# Patient Record
Sex: FEMALE | Race: BLACK OR AFRICAN AMERICAN | NOT HISPANIC OR LATINO | Employment: UNEMPLOYED | URBAN - METROPOLITAN AREA
[De-identification: names, ages, dates, MRNs, and addresses within clinical notes are randomized per-mention and may not be internally consistent; named-entity substitution may affect disease eponyms.]

---

## 2023-06-30 PROBLEM — J45.909 ASTHMA (HHS-HCC): Status: ACTIVE | Noted: 2023-06-30

## 2023-06-30 PROBLEM — H52.7 REFRACTION ERROR: Status: ACTIVE | Noted: 2023-06-30

## 2023-06-30 PROBLEM — R63.2 POLYPHAGIA: Status: ACTIVE | Noted: 2023-06-30

## 2023-06-30 PROBLEM — L81.8 TATTOO: Status: ACTIVE | Noted: 2023-06-30

## 2023-06-30 PROBLEM — E11.9 DIABETES MELLITUS (MULTI): Status: ACTIVE | Noted: 2023-06-30

## 2023-06-30 PROBLEM — K21.9 GERD (GASTROESOPHAGEAL REFLUX DISEASE): Status: ACTIVE | Noted: 2023-06-30

## 2023-06-30 PROBLEM — M25.511 SHOULDER PAIN, RIGHT: Status: ACTIVE | Noted: 2023-06-30

## 2023-06-30 PROBLEM — M25.562 LEFT KNEE PAIN: Status: ACTIVE | Noted: 2023-06-30

## 2023-06-30 PROBLEM — M79.643 HAND PAIN: Status: ACTIVE | Noted: 2023-06-30

## 2023-06-30 PROBLEM — R05.3 CHRONIC COUGH: Status: ACTIVE | Noted: 2023-06-30

## 2023-06-30 PROBLEM — J30.9 ALLERGIC RHINITIS: Status: ACTIVE | Noted: 2023-06-30

## 2023-12-08 ENCOUNTER — OFFICE VISIT (OUTPATIENT)
Dept: SURGERY | Facility: HOSPITAL | Age: 48
End: 2023-12-08
Payer: COMMERCIAL

## 2023-12-08 VITALS
HEIGHT: 67 IN | OXYGEN SATURATION: 98 % | RESPIRATION RATE: 19 BRPM | DIASTOLIC BLOOD PRESSURE: 89 MMHG | WEIGHT: 274.5 LBS | HEART RATE: 95 BPM | TEMPERATURE: 98.2 F | SYSTOLIC BLOOD PRESSURE: 157 MMHG | BODY MASS INDEX: 43.08 KG/M2

## 2023-12-08 DIAGNOSIS — E63.9 INADEQUATE CALORIC INTAKE: ICD-10-CM

## 2023-12-08 DIAGNOSIS — Z72.3 INADEQUATE EXERCISE: ICD-10-CM

## 2023-12-08 DIAGNOSIS — E66.01 CLASS 3 SEVERE OBESITY DUE TO EXCESS CALORIES WITH SERIOUS COMORBIDITY AND BODY MASS INDEX (BMI) OF 40.0 TO 44.9 IN ADULT (MULTI): Primary | ICD-10-CM

## 2023-12-08 DIAGNOSIS — R63.2 POLYPHAGIA: ICD-10-CM

## 2023-12-08 PROBLEM — E66.813 CLASS 3 SEVERE OBESITY DUE TO EXCESS CALORIES WITH SERIOUS COMORBIDITY AND BODY MASS INDEX (BMI) OF 40.0 TO 44.9 IN ADULT: Status: ACTIVE | Noted: 2023-12-08

## 2023-12-08 PROCEDURE — 99204 OFFICE O/P NEW MOD 45 MIN: CPT

## 2023-12-08 PROCEDURE — 3079F DIAST BP 80-89 MM HG: CPT

## 2023-12-08 PROCEDURE — 99214 OFFICE O/P EST MOD 30 MIN: CPT | Mod: ZK

## 2023-12-08 PROCEDURE — 3008F BODY MASS INDEX DOCD: CPT

## 2023-12-08 PROCEDURE — 3077F SYST BP >= 140 MM HG: CPT

## 2023-12-08 ASSESSMENT — PATIENT HEALTH QUESTIONNAIRE - PHQ9
SUM OF ALL RESPONSES TO PHQ9 QUESTIONS 1 AND 2: 4
2. FEELING DOWN, DEPRESSED OR HOPELESS: MORE THAN HALF THE DAYS
1. LITTLE INTEREST OR PLEASURE IN DOING THINGS: MORE THAN HALF THE DAYS

## 2023-12-08 ASSESSMENT — PAIN SCALES - GENERAL: PAINLEVEL: 0-NO PAIN

## 2023-12-08 ASSESSMENT — LIFESTYLE VARIABLES
HOW MANY STANDARD DRINKS CONTAINING ALCOHOL DO YOU HAVE ON A TYPICAL DAY: 1 OR 2
AUDIT-C TOTAL SCORE: 1
HOW OFTEN DO YOU HAVE SIX OR MORE DRINKS ON ONE OCCASION: NEVER
HOW OFTEN DO YOU HAVE A DRINK CONTAINING ALCOHOL: MONTHLY OR LESS
SKIP TO QUESTIONS 9-10: 1

## 2023-12-08 NOTE — PATIENT INSTRUCTIONS
*Please schedule with outpatient dietitian to optimize the dietary recommendations below to your individual food preferences and dietary patterns by calling [phone]*    *If lab work ordered for you today - complete prior to next visit - labs are fasting*    A few key concepts for weight management:  1. Calorie control is necessary for weight loss  2. Protein prioritization helps control appetite and helps to maintain lean/muscle body mass with weight loss  3. Fiber rich foods ensure balanced blood sugar and help you stay full, also high nutrient foods  4. Consistency is critical for long term success  5. Successful weight loss requires trade offs - we acknowledge that meal planning and some element of preparation for most days of the week is necessary for success  6. Set an environment for success - keep binge-worthy processed food products out of the home as much as possible  7. Tracking some component of your nutrition intake (whether it's calories, macros, carbs, meal structure, etc) is a tool to help you stay consistent and accountable  8. Regular physical activity is critical for weight loss maintenance. Exercise helps us maintain lean body mass (muscle mass) which helps to maintain a higher metabolic rate (how many calories your burn daily). Without maintaining lean mass/muscle mass, weight loss is more difficult to sustain long term.     Detailed Diet Recommendations:  Self monitoring through some metric can be a tool to help with weight loss. Measuring portions can be one way to monitor your nutrition intake to support health improvement.   - Some apps you can use are RadioScapepal, Lose It, Carb Manager, Cronometer, etc. Self monitoring is an extremely useful tool however please recognize that external tracking devices for activity and calories are not perfect, underestimating of total calorie intake is common, as well as overestimation through activity trackers of total calorie expenditure.  "    Please limit processed foods in the diet as they are known to contribute to obesity and weight gain. Processed foods include food items like bakery, chips, snack crackers, cereals, cookies, some pasta/breads, etc. These foods will typically come from \"bag, boxes with barcodes\" from the middle aisles of the grocery store - they contain added sugars and refined flours, are low in fiber and may stimulate appetite rather than help manage it effectively.     Increasing your intake of HIGH FIBER carbohydrates sources to increase fullness and appetite regulation with meals and between meals. High fiber foods are vegetables, fruit, beans, lentils, nuts/seeds, etc.      WHAT WILL MY PLATE LOOK LIKE IF I EAT LIKE THIS?.    - Aim for MINIMUM 25-30g of protein at meals: approx 4 oz of high protein food like chicken, fish, turkey, beef, pork, 2-3 eggs, 1/2-3/4 cup cottage cheese or plain greek yogurt. At each meal EAT PROTEIN FIRST! This sets the stage to help better regulate your appetite during and between meals. Think \"Palm of your hand portion of protein\" at each meal.     - The remainder of the plate is optimized to increase quality of the diet by including plenty of vegetables, appropriate fruit or starch portions: Aim for \"2 fist sized portions of fruits and vegetables with each meal\". A \"fist sized\" portion is about 1 cup.   - Choose a variety of vegetables, fruits and whole grains - aim to eat a wide variety of colors to improve quality of diet.     Fat included with meals is best in small portions - Fat is calorie dense so it is important to monitor the portion, to avoid excess calories. Keep servings to the followin-2 tbsp olive/avocado/coconut oil (can be used to cook or dress vegetables), 1-2 tbsp unsweetened nut butter (peanut, almond, etc), 5-6 olives, 1/3 avocado, 1/2-1 oz nuts/seeds likes walnuts, almonds, pecans, brazil nuts, etc. This would include if you are cooking your meal with oil/butter/etc. "     Please avoid liquid sources of calories as we often do not think about their contribution to our total daily calorie intake nor do they help regulate appetite when working towards weight loss. Hydration is important, aim for at least 64 oz zero calorie beverages daily, ideally water.     Exercise Recommendations:   Continue regular exercise regimen - you are following an appropriate program of 4x/week of resistance training & aerobic training for 60-90 minutes per session, achieving > than 200 minute per week goal.    Web based resources for meal planning:  www.[employer] - This is a website authored by registered dietitians, has many great resources for healthy nutrition.     Follow up: 6 weeks    If appointment was not scheduled before leaving today please call [phone] to speak with an .    Follow up visits are typically VIRTUAL  - Please have a reliable scale to weigh yourself at home for follow up visits  - Please have a blood pressure cuff to monitor blood pressure & heart rate at home (can be purchased over the counter, on Amazon, drug store, etc).   - For virtual visits you must be in the State of [place]  - YOU CANNOT BE DRIVING, please remember this is an appointment and should be treated the same as if you were in the office for follow up appointment.

## 2023-12-08 NOTE — PROGRESS NOTES
Subjective     Patient ID: Sunshine Self is a 48 y.o. female who presents for Weight loss management (New patient visit).    HPI  Initial Weight: 274 lbs  Initial BMI: 43  Highest Weight: 274 lbs  Lowest weight: 210 lbs, 160 lbs prior to having kids    Otc or Prescription medications - Phentermine  High-Sugar beverages: couple times a week  FF/going out/take out: daily  Exercise: got membership to pool, did not start yet     Used to see Martha Wilson Last visit couple years ago (11/30/2021)    Past Medical History:   Diagnosis Date    Achilles tendinitis, unspecified leg     Achilles tendon pain    Contact with and (suspected) exposure to infections with a predominantly sexual mode of transmission 04/08/2015    Exposure to sexually transmitted disease (STD)    Inappropriate diet and eating habits 08/30/2018    Inappropriate diet and eating habits    Infection and inflammatory reaction due to cystostomy catheter, sequela 12/13/2016    Urinary tract infection associated with cystostomy catheter, sequela    Maternal care for other specified fetal problems, unspecified trimester, fetus 5 02/24/2015    Antepartum fetal acidosis, unspecified trimester, fetus 5    Other specified noninflammatory disorders of vagina 05/15/2015    Vaginal irritation    Personal history of other diseases of the digestive system 05/16/2016    History of hemorrhoids    Personal history of other diseases of the female genital tract 12/13/2016    History of vaginal discharge    Personal history of other diseases of the female genital tract 12/24/2014    History of dysmenorrhea    Personal history of other infectious and parasitic diseases 12/24/2014    History of herpes genitalis    Personal history of other specified conditions 06/10/2014    History of abdominal pain    Personal history of other specified conditions 11/09/2015    History of itching    Personal history of urinary (tract) infections 12/13/2016    History of urinary tract  "infection    Strain of right Achilles tendon, sequela 12/31/2015    Rupture of Achilles tendon, right, sequela      Past Surgical History:   Procedure Laterality Date    TUBAL LIGATION  03/02/2015    Tubal Ligation      Family History   Problem Relation Name Age of Onset    Lung cancer Mother      Breast cancer Mother's Sister        Social History     Tobacco Use   Smoking Status Every Day    Types: Cigarettes    Passive exposure: Past   Smokeless Tobacco Never      Social History     Substance and Sexual Activity   Drug Use Not Currently    Types: MDMA (ecstacy), Cocaine      Social History     Substance and Sexual Activity   Alcohol Use Yes        Allergies  Allergies   Allergen Reactions    Darvocet A500 [Propoxyphene N-Acetaminophen] Hives    Ibuprofen GI Upset    Naproxen GI Upset    Tramadol GI Upset          VITALS  Visit Vitals  /89   Pulse 95   Temp 36.8 °C (98.2 °F)   Resp 19   Ht 1.689 m (5' 6.5\")   Wt 125 kg (274 lb 8 oz)   SpO2 98%   BMI 43.64 kg/m²   Smoking Status Every Day   BSA 2.42 m²        LABS  No results found for: \"NFCURPRZ01\", \"BPPM3PX\", \"VITAMINB6\", \"TSH\", \"FOLATE\", \"PTH\", \"VITD25\", \"TIBC\", \"IRON\", \"FERRITIN\", \"PR1\", \"CMPLAS\", \"ZINC\", \"VTAI\", \"VITAMINK\"  No results found for: \"WBC\", \"HGB\", \"HCT\", \"MCV\", \"PLT\"         No lab exists for component: \"LABALBU\" No results found for: \"GLUCOSE\", \"CALCIUM\", \"NA\", \"K\", \"CO2\", \"CL\", \"BUN\", \"CREATININE\"    ROS  Review of Systems  GENERAL: Denies fever/chills       HEENT: Denies headache, new or worsening vision and hearing problems, nasal congestion, hoarseness, sore throat             CV: Denies chest pain, palpitations         RESP: Denies SOB, cough, wheezing              GI: Denies n/v, abdominal pain, diarrhea, constipation            : Denies urinary urgency/frequency     NEURO: Denies headache, weakness, numbness, tingling       ENDO: Denies excessive heat/cold, recent weight gain/loss        MUSC:Denies low back pain, joint pain      " PSYCH: Denies substance use disorder, anxiety, depression       PHYSICAL EXAM  Physical Exam  General- No acute distress, well appearing and well nourished. Obese  HEENT - no erythema, swelling or discharge. Neck supple, no cervical lymphadenopathy.   Pulmonary - respiratory effort normal, lungs CTAB.   Cardiovascular - HRR, no m/r/g  Extremities - no edema and/or varicosities, no peripheral edema  Abdomen - Soft, Non-tender, non-distended, no abdominal masses.   Musculoskeletal - Range of motion WNL  Skin - normal without rashes or lesions.  Neurologic - reflexes intact, coordination WNL, gait WNL  Psychiatric - AXO x3, mood and affect appropriate        ASSESSMENT/PLAN  Patient here for medical weight loss.    Assessment/Plan   Problem List Items Addressed This Visit       Polyphagia    Relevant Orders    Referral to Nutrition Services    Inadequate caloric intake    Relevant Orders    Referral to Nutrition Services    Inadequate exercise    Class 3 severe obesity due to excess calories with serious comorbidity and body mass index (BMI) of 40.0 to 44.9 in adult (CMS/[facility]) - Primary    Relevant Orders    Referral to Nutrition Services        Initial Weight: 274 lbs  Initial BMI: 43    Not interested in bariatric surgery     Blood work 12/01/23 (ordered by PCP)  CBC: , the rest of it WNL  CMP WNL  Hgb A1C 5.8 - Prediabetes     Reviewed past and active medical history, patient has no current contraindications to use of medication for adjunct treatment in weight management.  Discussed that patient has to start with the lifestyle modifications, such as adjust her diet and start with daily exercise. She has to adhere to the new lifestyle for 4-6 weeks, keep her food and exercise logs. These modifications will help the patient loose some weight, which will be considered a good chapo effort. After that we can start adding weight loss medications. Reviewed that medications for weight loss may be short or long term,  but that if weight loss is not realized within 12 weeks of initiating, medication will be discontinued to review alternative treatment options.    1500 hero meal plans, High Protein Snacks, [employer] Shopping List, Protein Count Guide and other handouts given.  Nutrition consult is placed.  Emphasized that medications are used as adjunct to diet and exercise for weight management    Counseled on dietary patterns to support weight loss and need for regular aerobic and strength based exercises to enhance weight loss and maintenance.      > 50% of time spent counseling on the importance of following recommended dietary modifications including: role of diet, low calorie and carbohydrate restrictions, limiting fast food and avoiding high sugar beverages.   Also discussed, the role of exercise with an ultimate goal of at least 250-300 minutes a week for weight loss and weight maintenance.    Follow-up: 6 weeks    RALPH Smith-CNP

## 2023-12-21 ENCOUNTER — APPOINTMENT (OUTPATIENT)
Dept: RADIOLOGY | Facility: CLINIC | Age: 48
End: 2023-12-21
Payer: COMMERCIAL

## 2023-12-21 ENCOUNTER — APPOINTMENT (OUTPATIENT)
Dept: ORTHOPEDIC SURGERY | Facility: CLINIC | Age: 48
End: 2023-12-21
Payer: COMMERCIAL

## 2024-01-02 ENCOUNTER — APPOINTMENT (OUTPATIENT)
Dept: ORTHOPEDIC SURGERY | Facility: CLINIC | Age: 49
End: 2024-01-02
Payer: COMMERCIAL

## 2024-01-05 ENCOUNTER — ANCILLARY PROCEDURE (OUTPATIENT)
Dept: RADIOLOGY | Facility: CLINIC | Age: 49
End: 2024-01-05
Payer: COMMERCIAL

## 2024-01-05 DIAGNOSIS — M25.561 RIGHT KNEE PAIN, UNSPECIFIED CHRONICITY: ICD-10-CM

## 2024-01-05 PROCEDURE — 73564 X-RAY EXAM KNEE 4 OR MORE: CPT | Mod: RT

## 2024-01-05 PROCEDURE — 73564 X-RAY EXAM KNEE 4 OR MORE: CPT | Mod: RIGHT SIDE | Performed by: RADIOLOGY

## 2024-01-15 ENCOUNTER — OFFICE VISIT (OUTPATIENT)
Dept: ORTHOPEDIC SURGERY | Facility: HOSPITAL | Age: 49
End: 2024-01-15
Payer: COMMERCIAL

## 2024-01-15 DIAGNOSIS — M25.561 RIGHT KNEE PAIN, UNSPECIFIED CHRONICITY: ICD-10-CM

## 2024-01-15 PROCEDURE — 99213 OFFICE O/P EST LOW 20 MIN: CPT | Performed by: ORTHOPAEDIC SURGERY

## 2024-01-15 PROCEDURE — 3008F BODY MASS INDEX DOCD: CPT | Performed by: ORTHOPAEDIC SURGERY

## 2024-01-15 RX ORDER — MELOXICAM 15 MG/1
15 TABLET ORAL DAILY
Qty: 10 TABLET | Refills: 0 | Status: SHIPPED | OUTPATIENT
Start: 2024-01-15 | End: 2024-01-25

## 2024-01-15 NOTE — PROGRESS NOTES
Patient here for evaluation of pain in the lateral aspect of her right knee that started 1 month ago when he twisted the knee while roughhousing.  She has pain and swelling and it is since improved somewhat she had taken some oral steroids which made her sick.  She also had a muscle relaxant and some pain medication which she took sparingly.  She is not on any medicines right now.  She did have x-rays which are available for review.    The patient is pleasant and cooperative.  The patient is alert and oriented ×3.  Auditory function is intact.  The patient is a good historian.  The patient is not in acute distress.  Eye exam significant for nonicteric sclera, intact ocular muscle movement.  Breathing is rhythmic symmetric and nonlabored.  Patient has body mass index of 43.  She has intact integument of right lower extremity small bruise on the anterior shin.  Calf is soft and supple nontender tibialis posterior pulse palpable Homans test is negative.  Range of motion of the right knee 0 to 130 degrees slight pain on hyperflexion slight tenderness of the lateral joint line no ligament pathologic laxity.  no effusion.    X-rays were obtained and reviewed today the tibiofemoral and retropatellar joint spaces are well-preserved no obvious osteophyte formation fracture dislocation or subluxation.    Right knee sprain    We discussed the potential diagnosis and treatment options and alternatives I recommended a course of anti-inflammatory medicine and observation over the next 2 weeks and repeat examination in 2 weeks.    This was dictated using voice recognition software and not corrected for grammatical or spelling errors.

## 2024-01-29 ENCOUNTER — OFFICE VISIT (OUTPATIENT)
Dept: ORTHOPEDIC SURGERY | Facility: HOSPITAL | Age: 49
End: 2024-01-29
Payer: COMMERCIAL

## 2024-01-29 DIAGNOSIS — S86.911A KNEE STRAIN, RIGHT, INITIAL ENCOUNTER: Primary | ICD-10-CM

## 2024-01-29 PROCEDURE — 99213 OFFICE O/P EST LOW 20 MIN: CPT | Performed by: ORTHOPAEDIC SURGERY

## 2024-01-29 PROCEDURE — 3008F BODY MASS INDEX DOCD: CPT | Performed by: ORTHOPAEDIC SURGERY

## 2024-01-29 RX ORDER — MELOXICAM 15 MG/1
15 TABLET ORAL DAILY
Qty: 30 TABLET | Refills: 11 | Status: SHIPPED | OUTPATIENT
Start: 2024-01-29 | End: 2025-01-28

## 2024-01-29 NOTE — PROGRESS NOTES
Patient is here for reevaluation of her right knee she has been concerned about a blood clot.  Her right knee is actually improved on meloxicam.  She still has some lateral anterior lateral pain.  Right knee manage is a very small effusion.  Range of motion 0 to 140 degrees.  Calf soft nontender Homans test negative tibialis posterior pulse palpable.    Right knee pain.    Patient has improved I recommended to 1 more week on meloxicam recommend formal physical therapy consult.  Prognosis is excellent.  Follow-up will be as needed.    This was dictated using voice recognition software and not corrected for grammatical or spelling errors.

## 2024-05-02 ENCOUNTER — APPOINTMENT (OUTPATIENT)
Dept: SURGERY | Facility: CLINIC | Age: 49
End: 2024-05-02
Payer: COMMERCIAL

## 2024-05-03 ENCOUNTER — APPOINTMENT (OUTPATIENT)
Dept: ORTHOPEDIC SURGERY | Facility: HOSPITAL | Age: 49
End: 2024-05-03
Payer: COMMERCIAL

## 2024-05-31 ENCOUNTER — OFFICE VISIT (OUTPATIENT)
Dept: ORTHOPEDIC SURGERY | Facility: HOSPITAL | Age: 49
End: 2024-05-31
Payer: COMMERCIAL

## 2024-05-31 VITALS — WEIGHT: 267 LBS | HEIGHT: 67 IN | BODY MASS INDEX: 41.91 KG/M2

## 2024-05-31 DIAGNOSIS — M79.604 PAIN AND SWELLING OF RIGHT LOWER EXTREMITY: ICD-10-CM

## 2024-05-31 DIAGNOSIS — M79.89 PAIN AND SWELLING OF RIGHT LOWER EXTREMITY: ICD-10-CM

## 2024-05-31 DIAGNOSIS — S83.241D ACUTE MEDIAL MENISCUS TEAR OF RIGHT KNEE, SUBSEQUENT ENCOUNTER: ICD-10-CM

## 2024-05-31 PROCEDURE — 99213 OFFICE O/P EST LOW 20 MIN: CPT | Performed by: ORTHOPAEDIC SURGERY

## 2024-05-31 PROCEDURE — 3008F BODY MASS INDEX DOCD: CPT | Performed by: ORTHOPAEDIC SURGERY

## 2024-05-31 RX ORDER — DICLOFENAC SODIUM 75 MG/1
75 TABLET, DELAYED RELEASE ORAL 2 TIMES DAILY
Qty: 28 TABLET | Refills: 0 | Status: SHIPPED | OUTPATIENT
Start: 2024-05-31 | End: 2024-06-14

## 2024-05-31 NOTE — PROGRESS NOTES
Here for reevaluation of her right knee she did have another opinion at [facility] and told that she might need surgery.  She has had recurrence of swelling and pain and tightness in her knee and leg.  She has mechanical capture as well.    Right knee definite effusion mild diffuse tenderness nonfocal.  Range of motion 0 to 100 degrees tightness in flexion with attempted beyond 100 degrees.  No instability to varus or valgus stress.  Calf soft and nontender tibialis posterior pulses palpable Homans test is negative.    Right knee injury.    Patient sustained a right knee injury which is continue to give her symptoms despite nonsurgical treatment, including anti-inflammatory medications specifically meloxicam and multiple evaluations.  I have recommended further evaluation by MRI scan.  Patient will follow-up after MRI scan new prescription for Voltaren provided today.    Given the tightness in the leg the patient has inquired about possible blood clot I think would be reasonable to obtain a duplex scan today.  Will have that done as well.    This was dictated using voice recognition software and not corrected for grammatical or spelling errors.

## 2024-06-15 ENCOUNTER — APPOINTMENT (OUTPATIENT)
Dept: PRIMARY CARE | Facility: CLINIC | Age: 49
End: 2024-06-15
Payer: COMMERCIAL

## 2024-06-18 ENCOUNTER — APPOINTMENT (OUTPATIENT)
Dept: RADIOLOGY | Facility: HOSPITAL | Age: 49
End: 2024-06-18
Payer: COMMERCIAL

## 2024-06-18 ENCOUNTER — APPOINTMENT (OUTPATIENT)
Dept: VASCULAR MEDICINE | Facility: HOSPITAL | Age: 49
End: 2024-06-18
Payer: COMMERCIAL

## 2024-07-02 ENCOUNTER — HOSPITAL ENCOUNTER (OUTPATIENT)
Dept: RADIOLOGY | Facility: CLINIC | Age: 49
Discharge: HOME | End: 2024-07-02
Payer: COMMERCIAL

## 2024-07-02 DIAGNOSIS — S83.241D ACUTE MEDIAL MENISCUS TEAR OF RIGHT KNEE, SUBSEQUENT ENCOUNTER: ICD-10-CM

## 2024-07-11 ENCOUNTER — HOSPITAL ENCOUNTER (OUTPATIENT)
Dept: RADIOLOGY | Facility: CLINIC | Age: 49
End: 2024-07-11
Payer: COMMERCIAL

## 2024-07-12 ENCOUNTER — APPOINTMENT (OUTPATIENT)
Dept: RADIOLOGY | Facility: CLINIC | Age: 49
End: 2024-07-12
Payer: COMMERCIAL

## 2024-07-12 ENCOUNTER — HOSPITAL ENCOUNTER (OUTPATIENT)
Dept: RADIOLOGY | Facility: CLINIC | Age: 49
End: 2024-07-12
Payer: COMMERCIAL

## 2024-07-18 ENCOUNTER — APPOINTMENT (OUTPATIENT)
Dept: RADIOLOGY | Facility: CLINIC | Age: 49
End: 2024-07-18
Payer: COMMERCIAL

## 2024-07-23 ENCOUNTER — APPOINTMENT (OUTPATIENT)
Dept: RADIOLOGY | Facility: CLINIC | Age: 49
End: 2024-07-23
Payer: COMMERCIAL

## 2024-08-05 ENCOUNTER — APPOINTMENT (OUTPATIENT)
Dept: RADIOLOGY | Facility: CLINIC | Age: 49
End: 2024-08-05
Payer: COMMERCIAL

## 2024-08-08 ENCOUNTER — OFFICE VISIT (OUTPATIENT)
Dept: GASTROENTEROLOGY | Facility: CLINIC | Age: 49
End: 2024-08-08
Payer: COMMERCIAL

## 2024-08-08 ENCOUNTER — OFFICE VISIT (OUTPATIENT)
Dept: DERMATOLOGY | Facility: CLINIC | Age: 49
End: 2024-08-08
Payer: COMMERCIAL

## 2024-08-08 VITALS
HEIGHT: 67 IN | HEART RATE: 81 BPM | DIASTOLIC BLOOD PRESSURE: 84 MMHG | WEIGHT: 263.6 LBS | BODY MASS INDEX: 41.37 KG/M2 | SYSTOLIC BLOOD PRESSURE: 138 MMHG | TEMPERATURE: 97.5 F

## 2024-08-08 DIAGNOSIS — L30.9 DERMATITIS: Primary | ICD-10-CM

## 2024-08-08 DIAGNOSIS — Z12.11 COLON CANCER SCREENING: ICD-10-CM

## 2024-08-08 DIAGNOSIS — R10.13 EPIGASTRIC PAIN: Primary | ICD-10-CM

## 2024-08-08 PROCEDURE — 99203 OFFICE O/P NEW LOW 30 MIN: CPT | Performed by: STUDENT IN AN ORGANIZED HEALTH CARE EDUCATION/TRAINING PROGRAM

## 2024-08-08 PROCEDURE — 99214 OFFICE O/P EST MOD 30 MIN: CPT | Performed by: NURSE PRACTITIONER

## 2024-08-08 PROCEDURE — 3008F BODY MASS INDEX DOCD: CPT | Performed by: NURSE PRACTITIONER

## 2024-08-08 PROCEDURE — 3079F DIAST BP 80-89 MM HG: CPT | Performed by: NURSE PRACTITIONER

## 2024-08-08 PROCEDURE — 3075F SYST BP GE 130 - 139MM HG: CPT | Performed by: NURSE PRACTITIONER

## 2024-08-08 RX ORDER — CIPROFLOXACIN 500 MG/1
TABLET ORAL
COMMUNITY
Start: 2024-04-01 | End: 2024-08-08 | Stop reason: WASHOUT

## 2024-08-08 RX ORDER — IBUPROFEN 600 MG/1
TABLET ORAL
COMMUNITY
Start: 2024-07-31 | End: 2024-08-08 | Stop reason: WASHOUT

## 2024-08-08 RX ORDER — CEPHALEXIN 500 MG/1
1 CAPSULE ORAL
COMMUNITY
Start: 2024-07-30 | End: 2024-08-08 | Stop reason: WASHOUT

## 2024-08-08 RX ORDER — CICLOPIROX 80 MG/ML
SOLUTION TOPICAL
COMMUNITY
Start: 2024-04-24

## 2024-08-08 RX ORDER — DEXTROAMPHETAMINE SACCHARATE, AMPHETAMINE ASPARTATE, DEXTROAMPHETAMINE SULFATE AND AMPHETAMINE SULFATE 7.5; 7.5; 7.5; 7.5 MG/1; MG/1; MG/1; MG/1
30 TABLET ORAL 2 TIMES DAILY
COMMUNITY
Start: 2024-08-02 | End: 2024-09-01

## 2024-08-08 RX ORDER — PERMETHRIN 50 MG/G
CREAM TOPICAL
COMMUNITY
Start: 2023-11-24

## 2024-08-08 RX ORDER — CLOBETASOL PROPIONATE 0.5 MG/G
OINTMENT TOPICAL
Qty: 60 G | Refills: 0 | Status: SHIPPED | OUTPATIENT
Start: 2024-08-08

## 2024-08-08 RX ORDER — PROMETHAZINE HYDROCHLORIDE AND CODEINE PHOSPHATE 6.25; 1 MG/5ML; MG/5ML
SOLUTION ORAL
COMMUNITY
Start: 2024-08-02

## 2024-08-08 RX ORDER — CLINDAMYCIN HYDROCHLORIDE 300 MG/1
300 CAPSULE ORAL 3 TIMES DAILY
COMMUNITY
Start: 2024-07-25

## 2024-08-08 RX ORDER — CAPSAICIN 0.03 G/100G
CREAM TOPICAL 4 TIMES DAILY
COMMUNITY
Start: 2024-04-24 | End: 2024-08-08 | Stop reason: WASHOUT

## 2024-08-08 RX ORDER — CLOTRIMAZOLE 1 %
CREAM (GRAM) TOPICAL
COMMUNITY

## 2024-08-08 RX ORDER — ACETAMINOPHEN 500 MG
TABLET ORAL
COMMUNITY
Start: 2024-04-19

## 2024-08-08 RX ORDER — PHENTERMINE HYDROCHLORIDE 37.5 MG/1
1 TABLET ORAL
COMMUNITY
Start: 2024-07-02

## 2024-08-08 RX ORDER — OFLOXACIN 3 MG/ML
SOLUTION AURICULAR (OTIC)
COMMUNITY
Start: 2024-05-23

## 2024-08-08 RX ORDER — POLYETHYLENE GLYCOL 400 AND PROPYLENE GLYCOL 4; 3 MG/ML; MG/ML
1 SOLUTION/ DROPS OPHTHALMIC
COMMUNITY
Start: 2024-04-05

## 2024-08-08 ASSESSMENT — DERMATOLOGY QUALITY OF LIFE (QOL) ASSESSMENT
RATE HOW BOTHERED YOU ARE BY SYMPTOMS OF YOUR SKIN PROBLEM (EG, ITCHING, STINGING BURNING, HURTING OR SKIN IRRITATION): 6 - ALWAYS BOTHERED
ARE THERE EXCLUSIONS OR EXCEPTIONS FOR THE QUALITY OF LIFE ASSESSMENT: NO
RATE HOW BOTHERED YOU ARE BY EFFECTS OF YOUR SKIN PROBLEMS ON YOUR ACTIVITIES (EG, GOING OUT, ACCOMPLISHING WHAT YOU WANT, WORK ACTIVITIES OR YOUR RELATIONSHIPS WITH OTHERS): 0 - NEVER BOTHERED
WHAT SINGLE SKIN CONDITION LISTED BELOW IS THE PATIENT ANSWERING THE QUALITY-OF-LIFE ASSESSMENT QUESTIONS ABOUT: NONE OF THE ABOVE
RATE HOW BOTHERED YOU ARE BY SYMPTOMS OF YOUR SKIN PROBLEM (EG, ITCHING, STINGING BURNING, HURTING OR SKIN IRRITATION): 6 - ALWAYS BOTHERED
RATE HOW EMOTIONALLY BOTHERED YOU ARE BY YOUR SKIN PROBLEM (FOR EXAMPLE, WORRY, EMBARRASSMENT, FRUSTRATION): 6 - ALWAYS BOTHERED
RATE HOW EMOTIONALLY BOTHERED YOU ARE BY YOUR SKIN PROBLEM (FOR EXAMPLE, WORRY, EMBARRASSMENT, FRUSTRATION): 6 - ALWAYS BOTHERED
DATE THE QUALITY-OF-LIFE ASSESSMENT WAS COMPLETED: 67060
WHAT SINGLE SKIN CONDITION LISTED BELOW IS THE PATIENT ANSWERING THE QUALITY-OF-LIFE ASSESSMENT QUESTIONS ABOUT: NONE OF THE ABOVE
RATE HOW BOTHERED YOU ARE BY EFFECTS OF YOUR SKIN PROBLEMS ON YOUR ACTIVITIES (EG, GOING OUT, ACCOMPLISHING WHAT YOU WANT, WORK ACTIVITIES OR YOUR RELATIONSHIPS WITH OTHERS): 0 - NEVER BOTHERED

## 2024-08-08 ASSESSMENT — PAIN SCALES - GENERAL: PAINLEVEL: 3

## 2024-08-08 ASSESSMENT — ENCOUNTER SYMPTOMS
RESPIRATORY NEGATIVE: 1
MUSCULOSKELETAL NEGATIVE: 1
CONSTITUTIONAL NEGATIVE: 1
ENDOCRINE NEGATIVE: 1
ALLERGIC/IMMUNOLOGIC NEGATIVE: 1
ABDOMINAL PAIN: 1
CARDIOVASCULAR NEGATIVE: 1
PSYCHIATRIC NEGATIVE: 1
NEUROLOGICAL NEGATIVE: 1
HEMATOLOGIC/LYMPHATIC NEGATIVE: 1
EYES NEGATIVE: 1

## 2024-08-08 ASSESSMENT — ITCH NUMERIC RATING SCALE: HOW SEVERE IS YOUR ITCHING?: 2

## 2024-08-08 NOTE — PATIENT INSTRUCTIONS
Epigastric pain- I would recommend using the esomeprazole daily to help with your symptoms. I will order a stool study for h-pylori as well as an EGD to evaluate the upper GI tract.     Screening for colonoscopy - I will order a cologuard test for you.    I will see you back for follow up after your testing

## 2024-08-08 NOTE — PROGRESS NOTES
Subjective     Sunshine Self is a 49 y.o. female who presents for the following: Rash (Swollen ear. Its burning and tender and top part of ear. ).     Review of Systems:  No other skin or systemic complaints other than what is documented elsewhere in the note.    The following portions of the chart were reviewed this encounter and updated as appropriate:          Skin Cancer History  No skin cancer on file.      Specialty Problems          Dermatology Problems    Tattoo        Objective   Well appearing patient in no apparent distress; mood and affect are within normal limits.    A focused skin examination was performed. All findings within normal limits unless otherwise noted below.    Assessment/Plan   1. Dermatitis  Left Ear, Left Preauricular Area  Well defined Reddish focally crusted fissured lichenified plaque    I favor eczematous given lichenifications, fissuring, crusting  There are no areas of hyperkeratosis to suspect discoid lupus    Related Medications  clobetasol (Temovate) 0.05 % ointment  Use a thin layer daily on ear for 2 weeks    Has used neosporin- STOP using neosporin    Start clobetsaol ointment for several weeks and will see if resolves  If continues occurring, consider patch testing or consideration of causative reaction from shampoo/coloring agent    Follow up as needed

## 2024-08-08 NOTE — PROGRESS NOTES
Subjective   Patient ID: Sunshine Self is a 49 y.o. female who presents for No chief complaint on file..  HPI  49-year-old female for new patient visit for evaluation of abdominal pain  Medical history includes asthma, COPD, ADHD, anxiety, bipolar disorder, obesity  Labs reviewed 5/9/2024  H&H 13.1 and 38.8  Epigastric pain  Felt like heart burn over the weekend  Was seen in the ER 3 times in the last few days  Was taking esomeprazole  Had black tarry after taking pepto bismol- now normal colored  Using aleve and motrin occasionally  No dysphagia  Did  FIT testing  FHX; no colon cancer  Social hx- smokes tobacco 1/2 ppd x 35 years, alcohol- occasionally, marijuana occasionally  Mom- lung cancer  Eating doesn't make a difference- just has pain no matter what  Goes up to her chest   Funny feeling in her chest  No nausea but mouth got watery  No diarrhea   No fever or chills      Review of Systems   Constitutional: Negative.    HENT: Negative.     Eyes: Negative.    Respiratory: Negative.     Cardiovascular: Negative.    Gastrointestinal:  Positive for abdominal pain.   Endocrine: Negative.    Genitourinary: Negative.    Musculoskeletal: Negative.    Skin: Negative.    Allergic/Immunologic: Negative.    Neurological: Negative.    Hematological: Negative.    Psychiatric/Behavioral: Negative.         Objective   Physical Exam  Constitutional:       Appearance: Normal appearance. She is obese.   HENT:      Head: Normocephalic.      Nose: Nose normal.      Mouth/Throat:      Mouth: Mucous membranes are moist.   Eyes:      Pupils: Pupils are equal, round, and reactive to light.   Cardiovascular:      Rate and Rhythm: Normal rate and regular rhythm.      Pulses: Normal pulses.      Heart sounds: Normal heart sounds.   Pulmonary:      Effort: Pulmonary effort is normal.      Breath sounds: Normal breath sounds.   Abdominal:      General: Bowel sounds are normal.      Palpations: Abdomen is soft.   Musculoskeletal:          General: Normal range of motion.      Cervical back: Normal range of motion and neck supple.   Skin:     General: Skin is warm and dry.   Neurological:      Mental Status: She is alert.   Psychiatric:         Mood and Affect: Mood normal.         Assessment/Plan        Epigastric pain- I would recommend using the esomeprazole daily to help with your symptoms. I will order a stool study for h-pylori as well as an EGD to evaluate the upper GI tract.     Screening for colonoscopy - I will order a cologuard test for you.    I will see you back for follow up after your testing    OLIVE Linder 08/08/24 8:14 AM

## 2024-08-14 ENCOUNTER — APPOINTMENT (OUTPATIENT)
Dept: OTOLARYNGOLOGY | Facility: CLINIC | Age: 49
End: 2024-08-14
Payer: COMMERCIAL

## 2024-08-15 ENCOUNTER — APPOINTMENT (OUTPATIENT)
Dept: PRIMARY CARE | Facility: CLINIC | Age: 49
End: 2024-08-15
Payer: COMMERCIAL

## 2024-08-15 DIAGNOSIS — L30.9 DERMATITIS: ICD-10-CM

## 2024-08-15 RX ORDER — CLOBETASOL PROPIONATE 0.5 MG/G
OINTMENT TOPICAL
Qty: 60 G | Refills: 0 | Status: SHIPPED | OUTPATIENT
Start: 2024-08-15

## 2024-08-16 ENCOUNTER — LAB (OUTPATIENT)
Dept: LAB | Facility: LAB | Age: 49
End: 2024-08-16
Payer: COMMERCIAL

## 2024-08-16 DIAGNOSIS — R10.13 EPIGASTRIC PAIN: ICD-10-CM

## 2024-08-16 PROCEDURE — 87449 NOS EACH ORGANISM AG IA: CPT

## 2024-08-19 ENCOUNTER — APPOINTMENT (OUTPATIENT)
Dept: CARDIOLOGY | Facility: CLINIC | Age: 49
End: 2024-08-19
Payer: COMMERCIAL

## 2024-08-19 ENCOUNTER — TELEPHONE (OUTPATIENT)
Dept: DERMATOLOGY | Facility: CLINIC | Age: 49
End: 2024-08-19
Payer: COMMERCIAL

## 2024-08-19 ENCOUNTER — OFFICE VISIT (OUTPATIENT)
Dept: ORTHOPEDIC SURGERY | Facility: HOSPITAL | Age: 49
End: 2024-08-19
Payer: COMMERCIAL

## 2024-08-19 DIAGNOSIS — M23.303 DERANGEMENT OF MEDIAL MENISCUS, RIGHT: ICD-10-CM

## 2024-08-19 PROCEDURE — 2500000005 HC RX 250 GENERAL PHARMACY W/O HCPCS: Performed by: ORTHOPAEDIC SURGERY

## 2024-08-19 PROCEDURE — 2500000004 HC RX 250 GENERAL PHARMACY W/ HCPCS (ALT 636 FOR OP/ED): Performed by: ORTHOPAEDIC SURGERY

## 2024-08-19 PROCEDURE — 20610 DRAIN/INJ JOINT/BURSA W/O US: CPT | Mod: RT | Performed by: ORTHOPAEDIC SURGERY

## 2024-08-19 PROCEDURE — 99213 OFFICE O/P EST LOW 20 MIN: CPT | Performed by: ORTHOPAEDIC SURGERY

## 2024-08-19 RX ORDER — TRIAMCINOLONE ACETONIDE 40 MG/ML
40 INJECTION, SUSPENSION INTRA-ARTICULAR; INTRAMUSCULAR
Status: COMPLETED | OUTPATIENT
Start: 2024-08-19 | End: 2024-08-19

## 2024-08-19 RX ORDER — LIDOCAINE HYDROCHLORIDE 10 MG/ML
4 INJECTION INFILTRATION; PERINEURAL
Status: COMPLETED | OUTPATIENT
Start: 2024-08-19 | End: 2024-08-19

## 2024-08-19 NOTE — LETTER
August 19, 2024     Patient: Sunshine Self   YOB: 1975   Date of Visit: 8/19/2024       To Whom It May Concern:    It is my medical opinion that Sunshine Self  may return to class today.  She attended an appointment this morning in my office .    If you have any questions or concerns, please don't hesitate to call.         Sincerely,        Sharad Acosta MD

## 2024-08-19 NOTE — PROGRESS NOTES
Patient is here for reevaluation of her right knee she continues to have pain in the right knee and swelling.  Nothing she has tried has worked including anti-inflammatory medicine rest activity modification and ice.    Patient is walking with a cane and a severely antalgic gait.  She definitely has effusion of the right knee.  Range of motion is limited 0 to 90 degrees.  No ligament pathologic laxity.  The knee is diffusely tender.    Right knee pain    Patient is right knee pain and differential diagnosis includes early osteoarthritis and meniscus pathology.  I have recommended the patient try again to get an MRI scan we will order an open scan for her.  She is quite claustrophobic at this time.  In addition I recommended injection and intra-articular injection was performed today and tolerated well by the patient.  Patient will follow-up after MRI.      This was dictated using voice recognition software and not corrected for grammatical or spelling errors.    L Inj/Asp: R knee on 8/19/2024 9:53 AM  Indications: pain  Details: 22 G needle, anterior approach  Medications: 40 mg triamcinolone acetonide 40 mg/mL; 4 mL lidocaine 10 mg/mL (1 %)  Outcome: tolerated well, no immediate complications  Procedure, treatment alternatives, risks and benefits explained, specific risks discussed. Consent was given by the patient. Immediately prior to procedure a time out was called to verify the correct patient, procedure, equipment, support staff and site/side marked as required.

## 2024-08-19 NOTE — TELEPHONE ENCOUNTER
Pt left VM that she needs appt with Dr Velazquez for new lesions under arms and private area --boils ?

## 2024-08-20 LAB — H PYLORI AG STL QL IA: NEGATIVE

## 2024-09-03 ENCOUNTER — APPOINTMENT (OUTPATIENT)
Dept: RADIOLOGY | Facility: CLINIC | Age: 49
End: 2024-09-03
Payer: COMMERCIAL

## 2024-09-06 ENCOUNTER — APPOINTMENT (OUTPATIENT)
Dept: DERMATOLOGY | Facility: CLINIC | Age: 49
End: 2024-09-06
Payer: COMMERCIAL

## 2024-09-09 PROBLEM — R07.89 OTHER CHEST PAIN: Status: ACTIVE | Noted: 2024-09-09

## 2024-09-10 PROBLEM — J45.20 MILD INTERMITTENT ASTHMA WITHOUT COMPLICATION (HHS-HCC): Status: ACTIVE | Noted: 2018-06-25

## 2024-09-10 PROBLEM — A60.00 GENITAL HERPES SIMPLEX: Status: ACTIVE | Noted: 2024-09-10

## 2024-09-10 PROBLEM — R79.89 ELEVATED TSH: Status: ACTIVE | Noted: 2024-07-19

## 2024-09-10 PROBLEM — S86.011A RUPTURE OF RIGHT ACHILLES TENDON: Status: ACTIVE | Noted: 2024-09-10

## 2024-09-10 PROBLEM — I48.91 ATRIAL FIBRILLATION (MULTI): Status: ACTIVE | Noted: 2024-05-09

## 2024-09-10 PROBLEM — F90.9 ADHD (ATTENTION DEFICIT HYPERACTIVITY DISORDER): Status: ACTIVE | Noted: 2018-09-11

## 2024-09-10 PROBLEM — J45.40 MODERATE PERSISTENT ASTHMA WITHOUT COMPLICATION (HHS-HCC): Status: ACTIVE | Noted: 2018-06-25

## 2024-09-10 PROBLEM — U09.9 POST-ACUTE COVID-19 SYNDROME: Status: ACTIVE | Noted: 2024-09-10

## 2024-09-10 PROBLEM — R87.810 CERVICAL HIGH RISK HPV (HUMAN PAPILLOMAVIRUS) TEST POSITIVE: Status: ACTIVE | Noted: 2022-07-14

## 2024-09-10 PROBLEM — R13.10 DYSPHAGIA: Status: ACTIVE | Noted: 2024-03-20

## 2024-09-10 PROBLEM — F14.90 CRACK COCAINE USE: Status: ACTIVE | Noted: 2017-03-17

## 2024-09-10 PROBLEM — R51.9 HEADACHE: Status: ACTIVE | Noted: 2024-09-10

## 2024-09-18 ENCOUNTER — APPOINTMENT (OUTPATIENT)
Dept: CARDIOLOGY | Facility: HOSPITAL | Age: 49
End: 2024-09-18
Payer: COMMERCIAL

## 2024-09-18 ENCOUNTER — APPOINTMENT (OUTPATIENT)
Dept: OTOLARYNGOLOGY | Facility: CLINIC | Age: 49
End: 2024-09-18
Payer: COMMERCIAL

## 2024-09-18 DIAGNOSIS — R07.89 OTHER CHEST PAIN: Primary | ICD-10-CM

## 2024-09-18 NOTE — PROGRESS NOTES
Location of visit: [facility]   Type of Visit: New    Chief Complaint:  Patient was referred to Cardiology for ***.    History Of Present Illness:    Sunshine Self is a 49 y.o. female, with history significant for Afib, HTN, HLD, BMI 41, T2DM, GERD, CKD, BPD, crack/cocaine abuse, and asthma, who visits Cardiology today as a new patient  for ***.    Patient denies chest pain, dyspnea on exertion, shortness of breath, orthopnea, PND, nocturia, edema, palpitations, dizziness, lightheadedness, syncope, claudication, or snoring/apnea.    Blood pressure: ***/*** mmHg  HR: *** bpm    Today's ECG shows sinus rhythm at *** bpm, normal AV conduction, and normal ventricular repolarization.    Past Medical History:  She has a past medical history of Achilles tendinitis, unspecified leg, Contact with and (suspected) exposure to infections with a predominantly sexual mode of transmission (04/08/2015), Inappropriate diet and eating habits (08/30/2018), Infection and inflammatory reaction due to cystostomy catheter, sequela (12/13/2016), Maternal care for other specified fetal problems, unspecified trimester, fetus 5 ([facility]) (02/24/2015), Other specified noninflammatory disorders of vagina (05/15/2015), Personal history of other diseases of the digestive system (05/16/2016), Personal history of other diseases of the female genital tract (12/13/2016), Personal history of other diseases of the female genital tract (12/24/2014), Personal history of other infectious and parasitic diseases (12/24/2014), Personal history of other specified conditions (06/10/2014), Personal history of other specified conditions (11/09/2015), Personal history of urinary (tract) infections (12/13/2016), and Strain of right Achilles tendon, sequela (12/31/2015).    Past Surgical History:  She has a past surgical history that includes Tubal ligation (03/02/2015).    Social History:  She reports that she has been smoking cigarettes. She has been  "exposed to tobacco smoke. She has never used smokeless tobacco. She reports current alcohol use. She reports that she does not currently use drugs after having used the following drugs: MDMA (ecstacy) and Cocaine.    Family History:  Family History   Problem Relation Name Age of Onset    Lung cancer Mother      Breast cancer Mother's Sister       Allergies:  Darvocet a500 [propoxyphene n-acetaminophen]    Outpatient Medications:  Current Outpatient Medications   Medication Instructions    Abilify 15 mg tablet 1 tablet, oral, Daily    acetaminophen (Tylenol) 500 mg tablet     albuterol 90 mcg/actuation inhaler 2 puffs, inhalation, Every 4 hours PRN    ALPRAZolam (XANAX) 2 mg, oral    amphetamine-dextroamphetamine (Adderall) 30 mg tablet     ciclopirox (Penlac) 8 % solution Topical    clindamycin (CLEOCIN) 300 mg, oral, 3 times daily    clobetasol (Temovate) 0.05 % ointment Use a thin layer daily on ear for 2 weeks    clotrimazole (Lotrimin) 1 % cream APPLY TOPICALLY TO AFFECTED AREA AND RUB INTO SKIN TWICE DAILY FOR 1 TO 3 WEEKS. WASH HANDS AFTERWARD WITH SOAP AND WATER    esomeprazole (NexIUM) 40 mg DR capsule     Flovent HFA 44 mcg/actuation inhaler 1 puff, inhalation, 2 times daily RT    ibuprofen 600 mg tablet     LaMICtal 25 mg tablet 1 tablet, oral, Nightly    ofloxacin (Floxin) 0.3 % otic solution     ondansetron (Zofran) 4 mg tablet     promethazine-codeine (Phenergan W/Codeine) 6.25-10 mg/5 mL syrup     Systane, propylene glycoL, 0.4-0.3 % drops ophthalmic drops 1 drop, ophthalmic (eye)     Last Recorded Vitals:  There were no vitals filed for this visit.  Physical Exam:      8/8/2024     8:16 AM 5/31/2024    10:05 AM 12/8/2023     8:35 AM 4/13/2022    10:11 AM 11/30/2021    11:23 AM   Vitals   Systolic 138  157 148    Diastolic 84  89 80    Heart Rate 81  95 85    Temp 36.4 °C (97.5 °F)  36.8 °C (98.2 °F) 36 °C (96.8 °F)    Resp   19 18    Height (in) 1.702 m (5' 7\") 1.702 m (5' 7\") 1.689 m (5' 6.5\") 1.702 " "m (5' 7\") 1.702 m (5' 7\")   Weight (lb) 263.6 267 274.5 271 282   BMI 41.29 kg/m2 41.82 kg/m2 43.64 kg/m2 42.44 kg/m2 44.17 kg/m2   BSA (m2) 2.38 m2 2.39 m2 2.42 m2 2.41 m2 2.46 m2   Visit Report Report    Report Report Report       Wt Readings from Last 5 Encounters:   24 120 kg (263 lb 9.6 oz)   24 121 kg (267 lb)   23 125 kg (274 lb 8 oz)   22 123 kg (271 lb)   21 128 kg (282 lb)     General: Sitting up comfortably in chair; in no apparent distress.  HEENT: Normocephalic; atraumatic. Well hydrated.  Eyes: Anicteric sclera. Extraocular movement intact.  Neck: Supple; no thyromegaly; normal jugular venous pressure, no bruits.  Respiratory: Bilateral air entry equal. No wheezing.  Cardiovascular: Normal S1, S2; no murmurs auscultated.  Abdomen: Nondistended; nontender. (+) bowel sounds.  Extremities: No peripheral edema present. Pulses 2+ diffusely.  Neurological: Oriented to time, place, and person; nonfocal.  Psychiatric: Normal affect.     Last Labs Reviewed:  HEME/ENDO -  Recent Labs     23  1006 23  1137 22  1012 21  0750   HGBA1C 5.8* 5.1 5.3 5.2     Last Cardiology/Imaging Tests Personally Reviewed (if images available) and Interpreted:  EC2016 Sinus rhythm at 67 bpm, normal AV conduction, normal QRS axis, and normal ventricular repolarization.     Echocardiogram:  Transthoracic Echocardiogram; 2024 ([facility], no images)  The left ventricular ejection fraction (LVEF) is 65%.  Diastolic LV function is indeterminate.  Normal RV systolic function.  Dilated left atrium.  Concentric left ventricular hypertrophy is present.  Mild RVSP elevation  Noninvasive hemodynamic assessment is consistent with an elevated CVP.     Cath:  No results found.    Stress Test:  No results found.    Cardiac CT/MRI:  No results found.    CV RISK FACTORS:   # Hypertension: Last BP:  .  # Hyperlipidemia: Last Tchol No results found for requested labs within last 365 days. / " LDL No results found for requested labs within last 365 days. / HDL No results found for requested labs within last 365 days. / TRIG No results found for requested labs within last 365 days. (No results in last year.).  # Type II Diabetes Mellitus: Last A1c 5.8 (12/1/2023: 10:06 AM).  # Obesity: Last BMI:  .  # CKD: Last BUN/Cr (GFR): No results found for requested labs within last 365 days./No results found for requested labs within last 365 days. (No results found for requested labs within last 365 days.), No results in last year..    ASCV RISK:  The ASCVD Risk score (Mirtha DK, et al., 2019) failed to calculate for the following reasons:    Cannot find a previous HDL lab    Cannot find a previous total cholesterol lab    Assessment:  ***  Assessment & Plan      {Follow up results:65334}  I spent *** minutes assessing the case between pre-charting, face-to-face patient interaction, and documentation    Sid Geiger MD

## 2024-09-26 LAB — NONINV COLON CA DNA+OCC BLD SCRN STL QL: NORMAL

## 2024-10-04 NOTE — PROGRESS NOTES
Primary Care Physician: Linda Reyes MD  Date of Visit: 10/08/2024  8:00 AM EDT      Chief Complaint:     Pt self referred for famhx CAD     HPI / Summary:   Sunshine Self is a 49 y.o. female with history of bipolar, obesity, asthma, hyperlipidemia, mild atherosclerosis on coronary calcium score 2019 here for further evaluation.  Says she had been seeing cardiology at [facility] for family history and wants to establish care due to closer to home.  She is very nervous about her heart health as her father  of an MI at age 65 and mother had stents in her 60s and just recently passed away with cancer.  She denies any chest pain.  Does report some dyspnea on exertion which she attributes mostly to asthma.  She is hopeful to get a stress test as she would like to increase some exercise.  Emotional in the room today at times recalling her parents death as well as her son who was murdered several years ago.  Says she limits her time outside as the killers are still out there and she is worried about someone attacking her in the middle of the day.    Continues to smoke about 6 to 7 cigarettes a day    Last Cardiology Tests:  EC2024  NSR, possible LA enlargement    Echo:  24 ([facility]):  ormal LV systolic function.    The left ventricular ejection fraction (LVEF) is 65%.    Diastolic LV function assessed by resting Doppler is uncertain.    Normal RV systolic function.    Dilated left atrium.    Concentric left ventricular hypertrophy is present.    No hemodynamically significant valve disease.     Cath:  N/A    Stress Test:  N/A  Cardiac Imaging:  CT coronary calcium 2019:  IMPRESSION:   Calcium score of 28 Agatston units compatible with mild disease   involving the proximal left anterior descending artery.     Past Medical History:  Past Medical History:   Diagnosis Date    Achilles tendinitis, unspecified leg     Achilles tendon pain    Contact with and (suspected) exposure to infections with a predominantly sexual  mode of transmission 04/08/2015    Exposure to sexually transmitted disease (STD)    Inappropriate diet and eating habits 08/30/2018    Inappropriate diet and eating habits    Infection and inflammatory reaction due to cystostomy catheter, sequela 12/13/2016    Urinary tract infection associated with cystostomy catheter, sequela    Maternal care for other specified fetal problems, unspecified trimester, fetus 5 ([facility]) 02/24/2015    Antepartum fetal acidosis, unspecified trimester, fetus 5    Other specified noninflammatory disorders of vagina 05/15/2015    Vaginal irritation    Personal history of other diseases of the digestive system 05/16/2016    History of hemorrhoids    Personal history of other diseases of the female genital tract 12/13/2016    History of vaginal discharge    Personal history of other diseases of the female genital tract 12/24/2014    History of dysmenorrhea    Personal history of other infectious and parasitic diseases 12/24/2014    History of herpes genitalis    Personal history of other specified conditions 06/10/2014    History of abdominal pain    Personal history of other specified conditions 11/09/2015    History of itching    Personal history of urinary (tract) infections 12/13/2016    History of urinary tract infection    Strain of right Achilles tendon, sequela 12/31/2015    Rupture of Achilles tendon, right, sequela        Past Surgical History:  Past Surgical History:   Procedure Laterality Date    TUBAL LIGATION  03/02/2015    Tubal Ligation          Social History:  She reports that she has been smoking cigarettes. She has been exposed to tobacco smoke. She has never used smokeless tobacco. She reports current alcohol use. She reports that she does not currently use drugs after having used the following drugs: MDMA (ecstacy) and Cocaine.    Family History:  family history includes Breast cancer in her mother's sister; Lung cancer in her mother.      Allergies:  Allergies  "  Allergen Reactions    Darvocet A500 [Propoxyphene N-Acetaminophen] Hives       Outpatient Medications:  Current Outpatient Medications   Medication Instructions    Abilify 15 mg tablet 1 tablet, oral, Daily    albuterol 90 mcg/actuation inhaler 2 puffs, inhalation, Every 4 hours PRN    ALPRAZolam (XANAX) 2 mg, oral    amphetamine-dextroamphetamine (Adderall) 30 mg tablet     ciclopirox (Penlac) 8 % solution Topical    esomeprazole (NexIUM) 40 mg DR capsule     Flovent HFA 44 mcg/actuation inhaler 1 puff, inhalation, 2 times daily RT    ibuprofen 600 mg tablet     LaMICtal 25 mg tablet 1 tablet, oral, Nightly    promethazine-codeine (Phenergan W/Codeine) 6.25-10 mg/5 mL syrup     Systane, propylene glycoL, 0.4-0.3 % drops ophthalmic drops 1 drop, ophthalmic (eye)       Review of Systems:  A complete 10 point ROS was performed and is negative except for HPI    Physical Exam:  GENERAL: alert, cooperative, pleasant, in no acute distress  SKIN: warm, dry, no rash.  NECK: no JVD  CARDIAC: Regular rate and rhythm with no rubs, murmurs, or gallops  CHEST: Normal respiratory efforts, lungs clear to auscultation bilaterally.  ABDOMEN: soft, nontender, nondistended  EXTREMITIES: no edema  NEURO: Alert and oriented x 3.  Grossly normal.  Moves all 4 extremities.    Vitals:    10/08/24 0812   BP: 144/76   BP Location: Left arm   Pulse: 82   SpO2: 95%   Weight: 118 kg (260 lb)     Wt Readings from Last 5 Encounters:   08/08/24 120 kg (263 lb 9.6 oz)   05/31/24 121 kg (267 lb)   12/08/23 125 kg (274 lb 8 oz)   04/13/22 123 kg (271 lb)   11/30/21 128 kg (282 lb)     Body mass index is 40.72 kg/m².        Last Labs:  CMP:No results for input(s): \"NA\", \"K\", \"CL\", \"CO2\", \"ANIONGAP\", \"BUN\", \"CREATININE\", \"EGFR\", \"GLUCOSE\" in the last 43156 hours.No results for input(s): \"ALBUMIN\", \"ALKPHOS\", \"ALT\", \"AST\", \"BILITOT\", \"LIPASE\" in the last 23512 hours.    No lab exists for component: \"CA\"  CBC:No results for input(s): \"WBC\", \"HGB\", " "\"HCT\", \"PLT\", \"MCV\" in the last 19147 hours.  COAG: No results for input(s): \"INR\", \"DDIMERVTE\" in the last 34492 hours.  ENDO:  Recent Labs     12/01/23  1006 01/05/23  1137 05/03/22  1012 06/28/21  0750   HGBA1C 5.8* 5.1 5.3 5.2      CARDIAC: No results for input(s): \"CKMB\", \"TROPHS\", \"BNP\" in the last 92253 hours.    No lab exists for component: \"CK\", \"CKMBP\"No results for input(s): \"CHOL\", \"LDLF\", \"LDL\", \"LDLCALC\", \"HDL\", \"TRIG\" in the last 73418 hours.            Assessment/Plan   49-year-old female with family history of premature CAD in both father and mother, mild atherosclerosis on CAC score 2019, borderline HTN, obesity, prediabetes here for cardiac risk counseling.  She has some dyspnea on exertion which may be asthma related.  With her family history screening exercise stress ECG is reasonable and I will order.  We also need updated lipid level.  BP borderline today and she is not open to medical therapy at the moment but perhaps can discuss further with her primary care provider.  Need for diet and exercise reinforced.  She also knows needs to quit smoking but has been unable to yet and wants to do it on her own.    Follow-up TBD based on testing      Followup Appts:  Future Appointments   Date Time Provider Department Center   10/8/2024  8:00 AM Obey Rayo DO [id number]           ____________________________________________________________  Obey Rayo DO  [facility]  [facility]  "

## 2024-10-08 ENCOUNTER — OFFICE VISIT (OUTPATIENT)
Dept: CARDIOLOGY | Facility: CLINIC | Age: 49
End: 2024-10-08
Payer: COMMERCIAL

## 2024-10-08 ENCOUNTER — LAB (OUTPATIENT)
Dept: LAB | Facility: LAB | Age: 49
End: 2024-10-08
Payer: COMMERCIAL

## 2024-10-08 VITALS
WEIGHT: 260 LBS | OXYGEN SATURATION: 95 % | DIASTOLIC BLOOD PRESSURE: 76 MMHG | BODY MASS INDEX: 40.72 KG/M2 | HEART RATE: 82 BPM | SYSTOLIC BLOOD PRESSURE: 144 MMHG

## 2024-10-08 DIAGNOSIS — R06.09 DOE (DYSPNEA ON EXERTION): Primary | ICD-10-CM

## 2024-10-08 DIAGNOSIS — Z82.49 FAMILY HISTORY OF ISCHEMIC HEART DISEASE: ICD-10-CM

## 2024-10-08 DIAGNOSIS — E66.01 OBESITY, CLASS III, BMI 40-49.9 (MORBID OBESITY) (MULTI): ICD-10-CM

## 2024-10-08 DIAGNOSIS — R03.0 ELEVATED BLOOD PRESSURE READING: ICD-10-CM

## 2024-10-08 DIAGNOSIS — Z71.89 ENCOUNTER FOR CARDIAC RISK COUNSELING: ICD-10-CM

## 2024-10-08 DIAGNOSIS — R06.09 DOE (DYSPNEA ON EXERTION): ICD-10-CM

## 2024-10-08 LAB
CHOLEST SERPL-MCNC: 227 MG/DL (ref 0–199)
CHOLESTEROL/HDL RATIO: 3.2
EST. AVERAGE GLUCOSE BLD GHB EST-MCNC: 120 MG/DL
HBA1C MFR BLD: 5.8 %
HDLC SERPL-MCNC: 71.9 MG/DL
LDLC SERPL DIRECT ASSAY-MCNC: 132 MG/DL (ref 0–129)
NON-HDL CHOLESTEROL: 155 MG/DL (ref 0–149)

## 2024-10-08 PROCEDURE — 99214 OFFICE O/P EST MOD 30 MIN: CPT | Performed by: INTERNAL MEDICINE

## 2024-10-08 PROCEDURE — 83721 ASSAY OF BLOOD LIPOPROTEIN: CPT

## 2024-10-08 PROCEDURE — 36415 COLL VENOUS BLD VENIPUNCTURE: CPT

## 2024-10-08 PROCEDURE — 82465 ASSAY BLD/SERUM CHOLESTEROL: CPT

## 2024-10-08 PROCEDURE — 3077F SYST BP >= 140 MM HG: CPT | Performed by: INTERNAL MEDICINE

## 2024-10-08 PROCEDURE — 3078F DIAST BP <80 MM HG: CPT | Performed by: INTERNAL MEDICINE

## 2024-10-08 PROCEDURE — 83036 HEMOGLOBIN GLYCOSYLATED A1C: CPT

## 2024-10-08 PROCEDURE — 83718 ASSAY OF LIPOPROTEIN: CPT

## 2024-10-09 ENCOUNTER — TELEPHONE (OUTPATIENT)
Dept: CARDIOLOGY | Facility: CLINIC | Age: 49
End: 2024-10-09
Payer: COMMERCIAL

## 2024-10-16 ENCOUNTER — APPOINTMENT (OUTPATIENT)
Dept: CARDIOLOGY | Facility: HOSPITAL | Age: 49
End: 2024-10-16
Payer: COMMERCIAL

## 2024-10-18 ENCOUNTER — TELEPHONE (OUTPATIENT)
Dept: DERMATOLOGY | Facility: CLINIC | Age: 49
End: 2024-10-18
Payer: COMMERCIAL

## 2024-10-29 ENCOUNTER — APPOINTMENT (OUTPATIENT)
Dept: CARDIOLOGY | Facility: CLINIC | Age: 49
End: 2024-10-29
Payer: COMMERCIAL

## 2024-11-21 ENCOUNTER — APPOINTMENT (OUTPATIENT)
Dept: DERMATOLOGY | Facility: CLINIC | Age: 49
End: 2024-11-21
Payer: COMMERCIAL

## 2025-05-21 ENCOUNTER — APPOINTMENT (OUTPATIENT)
Dept: DERMATOLOGY | Facility: CLINIC | Age: 50
End: 2025-05-21
Payer: COMMERCIAL